# Patient Record
Sex: FEMALE | URBAN - NONMETROPOLITAN AREA
[De-identification: names, ages, dates, MRNs, and addresses within clinical notes are randomized per-mention and may not be internally consistent; named-entity substitution may affect disease eponyms.]

---

## 2022-02-11 ENCOUNTER — OFFICE VISIT (OUTPATIENT)
Dept: URBAN - NONMETROPOLITAN AREA CLINIC 4 | Facility: CLINIC | Age: 62
End: 2022-02-11

## 2022-03-01 ENCOUNTER — OFFICE VISIT (OUTPATIENT)
Dept: URBAN - METROPOLITAN AREA CLINIC 82 | Facility: CLINIC | Age: 62
End: 2022-03-01

## 2022-03-07 ENCOUNTER — OFFICE VISIT (OUTPATIENT)
Dept: URBAN - METROPOLITAN AREA CLINIC 82 | Facility: CLINIC | Age: 62
End: 2022-03-07

## 2023-03-10 ENCOUNTER — LAB OUTSIDE AN ENCOUNTER (OUTPATIENT)
Dept: URBAN - METROPOLITAN AREA CLINIC 82 | Facility: CLINIC | Age: 63
End: 2023-03-10

## 2023-03-10 ENCOUNTER — OFFICE VISIT (OUTPATIENT)
Dept: URBAN - METROPOLITAN AREA CLINIC 82 | Facility: CLINIC | Age: 63
End: 2023-03-10
Payer: COMMERCIAL

## 2023-03-10 VITALS
HEART RATE: 64 BPM | DIASTOLIC BLOOD PRESSURE: 78 MMHG | SYSTOLIC BLOOD PRESSURE: 129 MMHG | TEMPERATURE: 97.8 F | WEIGHT: 214 LBS | HEIGHT: 66 IN | BODY MASS INDEX: 34.39 KG/M2

## 2023-03-10 DIAGNOSIS — R10.13 EPIGASTRIC ABDOMINAL PAIN: ICD-10-CM

## 2023-03-10 DIAGNOSIS — K21.9 GASTROESOPHAGEAL REFLUX DISEASE, UNSPECIFIED WHETHER ESOPHAGITIS PRESENT: ICD-10-CM

## 2023-03-10 PROCEDURE — G9903 PT SCRN TBCO ID AS NON USER: HCPCS | Performed by: INTERNAL MEDICINE

## 2023-03-10 PROCEDURE — G8427 DOCREV CUR MEDS BY ELIG CLIN: HCPCS | Performed by: INTERNAL MEDICINE

## 2023-03-10 PROCEDURE — G8417 CALC BMI ABV UP PARAM F/U: HCPCS | Performed by: INTERNAL MEDICINE

## 2023-03-10 PROCEDURE — 99204 OFFICE O/P NEW MOD 45 MIN: CPT | Performed by: INTERNAL MEDICINE

## 2023-03-10 RX ORDER — LISINOPRIL 20 MG/1
1 TABLET TABLET ORAL ONCE A DAY
Status: ACTIVE | COMMUNITY

## 2023-03-10 RX ORDER — DEXLANSOPRAZOLE 60 MG/1
1 CAPSULE CAPSULE, DELAYED RELEASE ORAL ONCE A DAY
Qty: 90 | Refills: 1 | OUTPATIENT
Start: 2023-03-10

## 2023-03-10 RX ORDER — SUCRALFATE 1 G
1 TABLET ON AN EMPTY STOMACH TABLET ORAL TWICE A DAY
Qty: 180 TABLET | Refills: 1 | OUTPATIENT
Start: 2023-03-10 | End: 2023-09-06

## 2023-03-10 NOTE — HPI-TODAY'S VISIT:
3/10/2023 Patient is a 62 year old  female who presents for acid reflux. Patient states that she has a lot of burning and acid reflux. She states that the burning will migrate to her right upper quadrant and that it radiates to her back. She states that she has no issues with swallowing. Patient states that at night she has been having a lot of acid reflux. She states that she has had an EGD in the past. She states that she has been on pantoprazole for a while and that it really has not been helping. Patient states that she has tried carafate in the past and that it has helped some, and she has also tried omeprazole but she states that it did not work. She denies trying dexilant in the past. Patient was told that she does not have gastroparesis after doing the egg swallow test. She denies having a habit of taking NSAIDs. She denies family history of colon, esophageal, or stomach cancer. Patient states that her last GI doctor told her that he was worried about her symptoms being from her gall bladder, and that they checked for stones, but that they found none.

## 2023-03-13 ENCOUNTER — TELEPHONE ENCOUNTER (OUTPATIENT)
Dept: URBAN - METROPOLITAN AREA CLINIC 82 | Facility: CLINIC | Age: 63
End: 2023-03-13

## 2023-03-13 ENCOUNTER — TELEPHONE ENCOUNTER (OUTPATIENT)
Dept: URBAN - METROPOLITAN AREA CLINIC 35 | Facility: CLINIC | Age: 63
End: 2023-03-13

## 2023-03-13 RX ORDER — PANTOPRAZOLE SODIUM 40 MG/1
1 TABLET TABLET, DELAYED RELEASE ORAL ONCE A DAY
Qty: 90 | Refills: 1 | OUTPATIENT
Start: 2023-03-13

## 2023-04-20 ENCOUNTER — TELEPHONE ENCOUNTER (OUTPATIENT)
Dept: URBAN - METROPOLITAN AREA CLINIC 82 | Facility: CLINIC | Age: 63
End: 2023-04-20

## 2023-04-25 ENCOUNTER — OFFICE VISIT (OUTPATIENT)
Dept: URBAN - METROPOLITAN AREA MEDICAL CENTER 24 | Facility: MEDICAL CENTER | Age: 63
End: 2023-04-25
Payer: COMMERCIAL

## 2023-04-25 DIAGNOSIS — K29.60 ADENOPAPILLOMATOSIS GASTRICA: ICD-10-CM

## 2023-04-25 DIAGNOSIS — R07.89 ACUTE CHEST WALL PAIN: ICD-10-CM

## 2023-04-25 DIAGNOSIS — K21.00 ALKALINE REFLUX ESOPHAGITIS: ICD-10-CM

## 2023-04-25 PROCEDURE — 43239 EGD BIOPSY SINGLE/MULTIPLE: CPT | Performed by: INTERNAL MEDICINE

## 2023-04-25 PROCEDURE — 91035 G-ESOPH REFLX TST W/ELECTROD: CPT | Performed by: INTERNAL MEDICINE

## 2023-06-16 ENCOUNTER — LAB OUTSIDE AN ENCOUNTER (OUTPATIENT)
Dept: URBAN - METROPOLITAN AREA CLINIC 82 | Facility: CLINIC | Age: 63
End: 2023-06-16

## 2023-06-16 ENCOUNTER — OFFICE VISIT (OUTPATIENT)
Dept: URBAN - METROPOLITAN AREA CLINIC 82 | Facility: CLINIC | Age: 63
End: 2023-06-16
Payer: COMMERCIAL

## 2023-06-16 VITALS
HEIGHT: 66 IN | DIASTOLIC BLOOD PRESSURE: 76 MMHG | BODY MASS INDEX: 34.55 KG/M2 | WEIGHT: 215 LBS | SYSTOLIC BLOOD PRESSURE: 144 MMHG | TEMPERATURE: 98.2 F | HEART RATE: 69 BPM

## 2023-06-16 DIAGNOSIS — K21.9 GASTROESOPHAGEAL REFLUX DISEASE, UNSPECIFIED WHETHER ESOPHAGITIS PRESENT: ICD-10-CM

## 2023-06-16 DIAGNOSIS — R07.9 CHEST PAIN, UNSPECIFIED TYPE: ICD-10-CM

## 2023-06-16 DIAGNOSIS — R10.13 EPIGASTRIC ABDOMINAL PAIN: ICD-10-CM

## 2023-06-16 PROCEDURE — G8427 DOCREV CUR MEDS BY ELIG CLIN: HCPCS | Performed by: INTERNAL MEDICINE

## 2023-06-16 PROCEDURE — G8417 CALC BMI ABV UP PARAM F/U: HCPCS | Performed by: INTERNAL MEDICINE

## 2023-06-16 PROCEDURE — G9903 PT SCRN TBCO ID AS NON USER: HCPCS | Performed by: INTERNAL MEDICINE

## 2023-06-16 PROCEDURE — 99214 OFFICE O/P EST MOD 30 MIN: CPT | Performed by: INTERNAL MEDICINE

## 2023-06-16 RX ORDER — DICYCLOMINE HYDROCHLORIDE 10 MG/1
1 TABLET CAPSULE ORAL THREE TIMES A DAY
Qty: 90 | Refills: 1 | OUTPATIENT
Start: 2023-06-16 | End: 2023-08-15

## 2023-06-16 RX ORDER — DEXLANSOPRAZOLE 60 MG/1
1 CAPSULE CAPSULE, DELAYED RELEASE ORAL ONCE A DAY
Qty: 90 | Refills: 1 | Status: ACTIVE | COMMUNITY
Start: 2023-03-10

## 2023-06-16 RX ORDER — LISINOPRIL 20 MG/1
1 TABLET TABLET ORAL ONCE A DAY
Status: ACTIVE | COMMUNITY

## 2023-06-16 RX ORDER — PANTOPRAZOLE SODIUM 40 MG/1
1 TABLET TABLET, DELAYED RELEASE ORAL ONCE A DAY
Qty: 90 | Refills: 1 | Status: ACTIVE | COMMUNITY
Start: 2023-03-13

## 2023-06-16 RX ORDER — PANTOPRAZOLE SODIUM 40 MG/1
1 TABLET TABLET, DELAYED RELEASE ORAL ONCE A DAY
Qty: 90 | Refills: 1 | OUTPATIENT
Start: 2023-06-16

## 2023-06-16 RX ORDER — AMITRIPTYLINE HYDROCHLORIDE 25 MG/1
1 TABLET AT BEDTIME TABLET, FILM COATED ORAL ONCE A DAY
Qty: 90 | Refills: 1 | OUTPATIENT
Start: 2023-06-16

## 2023-06-16 RX ORDER — SUCRALFATE 1 G
1 TABLET ON AN EMPTY STOMACH TABLET ORAL TWICE A DAY
Qty: 180 TABLET | Refills: 1 | Status: ACTIVE | COMMUNITY
Start: 2023-03-10 | End: 2023-09-06

## 2023-06-16 NOTE — HPI-TODAY'S VISIT:
3/10/2023 Patient is a 62 year old  female who presents for acid reflux. Patient states that she has a lot of burning and acid reflux. She states that the burning will migrate to her right upper quadrant and that it radiates to her back. She states that she has no issues with swallowing. Patient states that at night she has been having a lot of acid reflux. She states that she has had an EGD in the past. She states that she has been on pantoprazole for a while and that it really has not been helping. Patient states that she has tried carafate in the past and that it has helped some, and she has also tried omeprazole but she states that it did not work. She denies trying dexilant in the past. Patient was told that she does not have gastroparesis after doing the egg swallow test. She denies having a habit of taking NSAIDs. She denies family history of colon, esophageal, or stomach cancer. Patient states that her last GI doctor told her that he was worried about her symptoms being from her gall bladder, and that they checked for stones, but that they found none.  6/16/2023 Patient presents for a follow up. Patient states that the heartburn and acid reflux is very bad. She states that she has been taking advil for the pain in her chest and epigastric area, and that it radiates to her right upper quadrant abdominal area. Patient denies going through stress, depression, or anxiety. She states that two other doctors had told her that she has a hiatal hernia but that was not seen in the EGD. Patient states that she has chest pain even when she does does movements with her right arm.

## 2023-07-28 ENCOUNTER — OFFICE VISIT (OUTPATIENT)
Dept: URBAN - METROPOLITAN AREA CLINIC 82 | Facility: CLINIC | Age: 63
End: 2023-07-28
Payer: COMMERCIAL

## 2023-07-28 VITALS
WEIGHT: 218 LBS | TEMPERATURE: 97.1 F | HEIGHT: 66 IN | HEART RATE: 64 BPM | DIASTOLIC BLOOD PRESSURE: 75 MMHG | BODY MASS INDEX: 35.03 KG/M2 | SYSTOLIC BLOOD PRESSURE: 154 MMHG

## 2023-07-28 DIAGNOSIS — K21.9 GASTROESOPHAGEAL REFLUX DISEASE, UNSPECIFIED WHETHER ESOPHAGITIS PRESENT: ICD-10-CM

## 2023-07-28 DIAGNOSIS — K22.4 ESOPHAGEAL SPASM: ICD-10-CM

## 2023-07-28 DIAGNOSIS — R10.13 EPIGASTRIC ABDOMINAL PAIN: ICD-10-CM

## 2023-07-28 DIAGNOSIS — R07.9 CHEST PAIN, UNSPECIFIED TYPE: ICD-10-CM

## 2023-07-28 PROCEDURE — 99214 OFFICE O/P EST MOD 30 MIN: CPT | Performed by: INTERNAL MEDICINE

## 2023-07-28 RX ORDER — PANTOPRAZOLE SODIUM 40 MG/1
1 TABLET TABLET, DELAYED RELEASE ORAL ONCE A DAY
Qty: 90 | Refills: 1 | Status: ON HOLD | COMMUNITY
Start: 2023-06-16

## 2023-07-28 RX ORDER — DICYCLOMINE HYDROCHLORIDE 10 MG/1
1 TABLET CAPSULE ORAL THREE TIMES A DAY
Qty: 90 | Refills: 1 | Status: ON HOLD | COMMUNITY
Start: 2023-06-16 | End: 2023-08-15

## 2023-07-28 RX ORDER — SUCRALFATE 1 G
1 TABLET ON AN EMPTY STOMACH TABLET ORAL TWICE A DAY
Qty: 180 TABLET | Refills: 1 | Status: ON HOLD | COMMUNITY
Start: 2023-03-10 | End: 2023-09-06

## 2023-07-28 RX ORDER — DILTIAZEM HYDROCHLORIDE 60 MG/1
AS DIRECTED TABLET, COATED ORAL THREE TIMES A DAY
Qty: 270 | Refills: 1 | OUTPATIENT
Start: 2023-07-28

## 2023-07-28 RX ORDER — AMITRIPTYLINE HYDROCHLORIDE 25 MG/1
1 TABLET AT BEDTIME TABLET, FILM COATED ORAL ONCE A DAY
Qty: 90 | Refills: 1 | Status: ON HOLD | COMMUNITY
Start: 2023-06-16

## 2023-07-28 RX ORDER — PANTOPRAZOLE SODIUM 40 MG/1
1 TABLET TABLET, DELAYED RELEASE ORAL ONCE A DAY
Qty: 90 | Refills: 1 | OUTPATIENT
Start: 2023-07-28

## 2023-07-28 RX ORDER — LISINOPRIL 20 MG/1
1 TABLET TABLET ORAL ONCE A DAY
Status: ACTIVE | COMMUNITY

## 2023-07-28 RX ORDER — DEXLANSOPRAZOLE 60 MG/1
1 CAPSULE CAPSULE, DELAYED RELEASE ORAL ONCE A DAY
Qty: 90 | Refills: 1 | Status: ON HOLD | COMMUNITY
Start: 2023-03-10

## 2023-07-28 RX ORDER — PANTOPRAZOLE SODIUM 40 MG/1
1 TABLET TABLET, DELAYED RELEASE ORAL ONCE A DAY
Qty: 90 | Refills: 1 | Status: ON HOLD | COMMUNITY
Start: 2023-03-13

## 2023-07-28 NOTE — HPI-TODAY'S VISIT:
3/10/2023 Patient is a 62 year old  female who presents for acid reflux. Patient states that she has a lot of burning and acid reflux. She states that the burning will migrate to her right upper quadrant and that it radiates to her back. She states that she has no issues with swallowing. Patient states that at night she has been having a lot of acid reflux. She states that she has had an EGD in the past. She states that she has been on pantoprazole for a while and that it really has not been helping. Patient states that she has tried carafate in the past and that it has helped some, and she has also tried omeprazole but she states that it did not work. She denies trying dexilant in the past. Patient was told that she does not have gastroparesis after doing the egg swallow test. She denies having a habit of taking NSAIDs. She denies family history of colon, esophageal, or stomach cancer. Patient states that her last GI doctor told her that he was worried about her symptoms being from her gall bladder, and that they checked for stones, but that they found none.  6/16/2023 Patient presents for a follow up. Patient states that the heartburn and acid reflux is very bad. She states that she has been taking advil for the pain in her chest and epigastric area, and that it radiates to her right upper quadrant abdominal area. Patient denies going through stress, depression, or anxiety. She states that two other doctors had told her that she has a hiatal hernia but that was not seen in the EGD. Patient states that she has chest pain even when she does does movements with her right arm.  7/28/2023 Patient is here for a follow up. Patient continues to experience chest pain. Patient experiences the most chest pain when she eats. She is currently taking lisinopril for her high blood pressure. She is not complaining of any abdominal pain.

## 2023-09-21 ENCOUNTER — OFFICE VISIT (OUTPATIENT)
Dept: URBAN - METROPOLITAN AREA CLINIC 115 | Facility: CLINIC | Age: 63
End: 2023-09-21

## 2023-10-26 ENCOUNTER — OFFICE VISIT (OUTPATIENT)
Dept: URBAN - METROPOLITAN AREA CLINIC 115 | Facility: CLINIC | Age: 63
End: 2023-10-26
Payer: COMMERCIAL

## 2023-10-26 VITALS
HEIGHT: 66 IN | HEART RATE: 59 BPM | SYSTOLIC BLOOD PRESSURE: 184 MMHG | TEMPERATURE: 97.3 F | DIASTOLIC BLOOD PRESSURE: 107 MMHG | RESPIRATION RATE: 15 BRPM | WEIGHT: 213.4 LBS | BODY MASS INDEX: 34.3 KG/M2

## 2023-10-26 DIAGNOSIS — R07.9 CHEST PAIN, UNSPECIFIED TYPE: ICD-10-CM

## 2023-10-26 DIAGNOSIS — K21.9 GASTROESOPHAGEAL REFLUX DISEASE, UNSPECIFIED WHETHER ESOPHAGITIS PRESENT: ICD-10-CM

## 2023-10-26 DIAGNOSIS — R10.13 EPIGASTRIC ABDOMINAL PAIN: ICD-10-CM

## 2023-10-26 DIAGNOSIS — K22.4 ESOPHAGEAL SPASM: ICD-10-CM

## 2023-10-26 PROBLEM — 266434009: Status: ACTIVE | Noted: 2023-07-28

## 2023-10-26 PROBLEM — 235595009: Status: ACTIVE | Noted: 2023-03-10

## 2023-10-26 PROCEDURE — 99214 OFFICE O/P EST MOD 30 MIN: CPT | Performed by: INTERNAL MEDICINE

## 2023-10-26 RX ORDER — PANTOPRAZOLE SODIUM 40 MG/1
1 TABLET TABLET, DELAYED RELEASE ORAL ONCE A DAY
Qty: 90 | Refills: 1 | Status: ACTIVE | COMMUNITY
Start: 2023-07-28

## 2023-10-26 RX ORDER — DEXLANSOPRAZOLE 60 MG/1
1 CAPSULE CAPSULE, DELAYED RELEASE ORAL ONCE A DAY
Qty: 90 | Refills: 1 | Status: ON HOLD | COMMUNITY
Start: 2023-03-10

## 2023-10-26 RX ORDER — LISINOPRIL 20 MG/1
1 TABLET TABLET ORAL ONCE A DAY
Status: ACTIVE | COMMUNITY

## 2023-10-26 RX ORDER — DILTIAZEM HYDROCHLORIDE 60 MG/1
AS DIRECTED TABLET, COATED ORAL THREE TIMES A DAY
Qty: 270 | Refills: 1 | Status: ACTIVE | COMMUNITY
Start: 2023-07-28

## 2023-10-26 RX ORDER — PANTOPRAZOLE SODIUM 40 MG/1
1 TABLET TABLET, DELAYED RELEASE ORAL ONCE A DAY
Qty: 90 | Refills: 1 | Status: ON HOLD | COMMUNITY
Start: 2023-03-13

## 2023-10-26 RX ORDER — PANTOPRAZOLE SODIUM 40 MG/1
1 TABLET TABLET, DELAYED RELEASE ORAL ONCE A DAY
Qty: 90 | Refills: 1 | Status: ON HOLD | COMMUNITY
Start: 2023-06-16

## 2023-10-26 RX ORDER — AMITRIPTYLINE HYDROCHLORIDE 25 MG/1
1 TABLET AT BEDTIME TABLET, FILM COATED ORAL ONCE A DAY
Qty: 90 | Refills: 1 | Status: ON HOLD | COMMUNITY
Start: 2023-06-16

## 2023-10-26 RX ORDER — LANSOPRAZOLE 30 MG/1
1 CAPSULE BEFORE A MEAL CAPSULE, DELAYED RELEASE ORAL TWICE A DAY
Qty: 60 CAPSULE | Refills: 3 | OUTPATIENT
Start: 2023-10-26

## 2023-10-26 NOTE — HPI-TODAY'S VISIT:
3/10/2023 Patient is a 62 year old  female who presents for acid reflux. Patient states that she has a lot of burning and acid reflux. She states that the burning will migrate to her right upper quadrant and that it radiates to her back. She states that she has no issues with swallowing. Patient states that at night she has been having a lot of acid reflux. She states that she has had an EGD in the past. She states that she has been on pantoprazole for a while and that it really has not been helping. Patient states that she has tried carafate in the past and that it has helped some, and she has also tried omeprazole but she states that it did not work. She denies trying dexilant in the past. Patient was told that she does not have gastroparesis after doing the egg swallow test. She denies having a habit of taking NSAIDs. She denies family history of colon, esophageal, or stomach cancer. Patient states that her last GI doctor told her that he was worried about her symptoms being from her gall bladder, and that they checked for stones, but that they found none.  6/16/2023 Patient presents for a follow up. Patient states that the heartburn and acid reflux is very bad. She states that she has been taking advil for the pain in her chest and epigastric area, and that it radiates to her right upper quadrant abdominal area. Patient denies going through stress, depression, or anxiety. She states that two other doctors had told her that she has a hiatal hernia but that was not seen in the EGD. Patient states that she has chest pain even when she does does movements with her right arm.  7/28/2023 Patient is here for a follow up. Patient continues to experience chest pain. Patient experiences the most chest pain when she eats. She is currently taking lisinopril for her high blood pressure. She is not complaining of any abdominal pain.  10/26/2023 Patient presetns for a follow up. She denies trouble swallowing and states that she has epigastric abdominal pain. She denies taking natural medications or NSAIDs. Patient is on pantoprazole and states that it has not been helping much. She denies any depression or stress. Patient states that she has pain almost every day. Patient states that she does not sleep enough hours at night. She denies any improvement in pain with dicyclomine.

## 2023-10-27 LAB
A/G RATIO: 1.7
ALBUMIN: 4.1
ALKALINE PHOSPHATASE: 55
ALT (SGPT): 18
AST (SGOT): 18
BASO (ABSOLUTE): 0
BASOS: 1
BILIRUBIN, TOTAL: 0.3
BUN/CREATININE RATIO: 16
BUN: 10
CALCIUM: 9.3
CARBON DIOXIDE, TOTAL: 25
CHLORIDE: 104
CREATININE: 0.61
EGFR: 100
EOS (ABSOLUTE): 0.2
EOS: 2
GLOBULIN, TOTAL: 2.4
GLUCOSE: 86
HEMATOCRIT: 33.5
HEMOGLOBIN: 10.4
IMMATURE GRANS (ABS): 0
IMMATURE GRANULOCYTES: 0
LIPASE: 40
LYMPHS (ABSOLUTE): 2.4
LYMPHS: 34
MCH: 28.3
MCHC: 31
MCV: 91
MONOCYTES(ABSOLUTE): 0.7
MONOCYTES: 10
NEUTROPHILS (ABSOLUTE): 3.8
NEUTROPHILS: 53
PLATELETS: 209
POTASSIUM: 4.2
PROTEIN, TOTAL: 6.5
RBC: 3.68
RDW: 11.7
SODIUM: 140
WBC: 7.1

## 2023-11-03 ENCOUNTER — TELEPHONE ENCOUNTER (OUTPATIENT)
Dept: URBAN - METROPOLITAN AREA CLINIC 6 | Facility: CLINIC | Age: 63
End: 2023-11-03

## 2023-11-03 RX ORDER — LANSOPRAZOLE 30 MG/1
1 CAPSULE BEFORE A MEAL CAPSULE, DELAYED RELEASE ORAL ONCE A DAY
Qty: 90 CAPSULE | Refills: 1 | OUTPATIENT
Start: 2023-11-03

## 2024-02-29 ENCOUNTER — LAB (OUTPATIENT)
Dept: URBAN - METROPOLITAN AREA CLINIC 115 | Facility: CLINIC | Age: 64
End: 2024-02-29

## 2024-02-29 ENCOUNTER — OV EP (OUTPATIENT)
Dept: URBAN - METROPOLITAN AREA CLINIC 115 | Facility: CLINIC | Age: 64
End: 2024-02-29
Payer: COMMERCIAL

## 2024-02-29 VITALS
WEIGHT: 213.4 LBS | SYSTOLIC BLOOD PRESSURE: 132 MMHG | TEMPERATURE: 98 F | BODY MASS INDEX: 34.3 KG/M2 | HEIGHT: 66 IN | HEART RATE: 52 BPM | DIASTOLIC BLOOD PRESSURE: 82 MMHG

## 2024-02-29 DIAGNOSIS — K22.4 ESOPHAGEAL SPASM: ICD-10-CM

## 2024-02-29 DIAGNOSIS — R10.13 EPIGASTRIC ABDOMINAL PAIN: ICD-10-CM

## 2024-02-29 DIAGNOSIS — R07.9 CHEST PAIN, UNSPECIFIED TYPE: ICD-10-CM

## 2024-02-29 DIAGNOSIS — D50.9 IRON DEFICIENCY ANEMIA, UNSPECIFIED IRON DEFICIENCY ANEMIA TYPE: ICD-10-CM

## 2024-02-29 DIAGNOSIS — K21.9 GASTROESOPHAGEAL REFLUX DISEASE, UNSPECIFIED WHETHER ESOPHAGITIS PRESENT: ICD-10-CM

## 2024-02-29 PROBLEM — 87522002: Status: ACTIVE | Noted: 2024-02-29

## 2024-02-29 PROCEDURE — 99214 OFFICE O/P EST MOD 30 MIN: CPT | Performed by: INTERNAL MEDICINE

## 2024-02-29 RX ORDER — AMITRIPTYLINE HYDROCHLORIDE 25 MG/1
1 TABLET AT BEDTIME TABLET, FILM COATED ORAL ONCE A DAY
Qty: 90 | Refills: 1 | Status: ON HOLD | COMMUNITY
Start: 2023-06-16

## 2024-02-29 RX ORDER — LANSOPRAZOLE 30 MG/1
1 CAPSULE BEFORE A MEAL CAPSULE, DELAYED RELEASE ORAL ONCE A DAY
Qty: 90 CAPSULE | Refills: 1 | Status: ACTIVE | COMMUNITY
Start: 2023-11-03

## 2024-02-29 RX ORDER — LANSOPRAZOLE 30 MG/1
1 CAPSULE BEFORE A MEAL CAPSULE, DELAYED RELEASE ORAL TWICE A DAY
Qty: 60 CAPSULE | Refills: 3 | Status: ACTIVE | COMMUNITY
Start: 2023-10-26

## 2024-02-29 RX ORDER — PANTOPRAZOLE SODIUM 40 MG/1
1 TABLET TABLET, DELAYED RELEASE ORAL ONCE A DAY
Qty: 90 | Refills: 1 | Status: ON HOLD | COMMUNITY
Start: 2023-06-16

## 2024-02-29 RX ORDER — PANTOPRAZOLE SODIUM 40 MG/1
1 TABLET TABLET, DELAYED RELEASE ORAL ONCE A DAY
Qty: 90 | Refills: 1 | Status: ON HOLD | COMMUNITY
Start: 2023-03-13

## 2024-02-29 RX ORDER — DILTIAZEM HYDROCHLORIDE 60 MG/1
AS DIRECTED TABLET, COATED ORAL THREE TIMES A DAY
Qty: 270 | Refills: 1 | Status: ACTIVE | COMMUNITY
Start: 2023-07-28

## 2024-02-29 RX ORDER — DEXLANSOPRAZOLE 60 MG/1
1 CAPSULE CAPSULE, DELAYED RELEASE ORAL ONCE A DAY
Qty: 90 | Refills: 1 | Status: ON HOLD | COMMUNITY
Start: 2023-03-10

## 2024-02-29 RX ORDER — LISINOPRIL 20 MG/1
1 TABLET TABLET ORAL ONCE A DAY
Status: ACTIVE | COMMUNITY

## 2024-02-29 RX ORDER — PANTOPRAZOLE SODIUM 40 MG/1
1 TABLET TABLET, DELAYED RELEASE ORAL ONCE A DAY
Qty: 90 | Refills: 1 | Status: ACTIVE | COMMUNITY
Start: 2023-07-28

## 2024-02-29 NOTE — HPI-TODAY'S VISIT:
3/10/2023 Patient is a 62 year old  female who presents for acid reflux. Patient states that she has a lot of burning and acid reflux. She states that the burning will migrate to her right upper quadrant and that it radiates to her back. She states that she has no issues with swallowing. Patient states that at night she has been having a lot of acid reflux. She states that she has had an EGD in the past. She states that she has been on pantoprazole for a while and that it really has not been helping. Patient states that she has tried carafate in the past and that it has helped some, and she has also tried omeprazole but she states that it did not work. She denies trying dexilant in the past. Patient was told that she does not have gastroparesis after doing the egg swallow test. She denies having a habit of taking NSAIDs. She denies family history of colon, esophageal, or stomach cancer. Patient states that her last GI doctor told her that he was worried about her symptoms being from her gall bladder, and that they checked for stones, but that they found none.  6/16/2023 Patient presents for a follow up. Patient states that the heartburn and acid reflux is very bad. She states that she has been taking advil for the pain in her chest and epigastric area, and that it radiates to her right upper quadrant abdominal area. Patient denies going through stress, depression, or anxiety. She states that two other doctors had told her that she has a hiatal hernia but that was not seen in the EGD. Patient states that she has chest pain even when she does does movements with her right arm.  7/28/2023 Patient is here for a follow up. Patient continues to experience chest pain. Patient experiences the most chest pain when she eats. She is currently taking lisinopril for her high blood pressure. She is not complaining of any abdominal pain.  10/26/2023 Patient presetns for a follow up. She denies trouble swallowing and states that she has epigastric abdominal pain. She denies taking natural medications or NSAIDs. Patient is on pantoprazole and states that it has not been helping much. She denies any depression or stress. Patient states that she has pain almost every day. Patient states that she does not sleep enough hours at night. She denies any improvement in pain with dicyclomine.  2/29/2024 Patient presents for a follow up. Patient states that she went to see Dr. Lomeli and she went through a stress test and calcium build up. She is still having the same chest pain. Patient's last colonoscopy was 2 years ago as per patient which was with Dr. Aguilar, and states that everything was normal. She states that when she takes pantoprazole, that it does not help the pain.

## 2024-04-11 ENCOUNTER — OV EP (OUTPATIENT)
Dept: URBAN - METROPOLITAN AREA CLINIC 115 | Facility: CLINIC | Age: 64
End: 2024-04-11
Payer: COMMERCIAL

## 2024-04-11 VITALS
TEMPERATURE: 94.9 F | HEIGHT: 66 IN | HEART RATE: 71 BPM | DIASTOLIC BLOOD PRESSURE: 80 MMHG | BODY MASS INDEX: 33.97 KG/M2 | SYSTOLIC BLOOD PRESSURE: 144 MMHG | WEIGHT: 211.4 LBS

## 2024-04-11 DIAGNOSIS — K22.4 ESOPHAGEAL SPASM: ICD-10-CM

## 2024-04-11 DIAGNOSIS — D50.9 IRON DEFICIENCY ANEMIA, UNSPECIFIED IRON DEFICIENCY ANEMIA TYPE: ICD-10-CM

## 2024-04-11 DIAGNOSIS — R10.13 EPIGASTRIC ABDOMINAL PAIN: ICD-10-CM

## 2024-04-11 DIAGNOSIS — K21.9 GASTROESOPHAGEAL REFLUX DISEASE, UNSPECIFIED WHETHER ESOPHAGITIS PRESENT: ICD-10-CM

## 2024-04-11 DIAGNOSIS — R07.9 CHEST PAIN, UNSPECIFIED TYPE: ICD-10-CM

## 2024-04-11 DIAGNOSIS — K44.9 HIATAL HERNIA: ICD-10-CM

## 2024-04-11 PROCEDURE — 99214 OFFICE O/P EST MOD 30 MIN: CPT | Performed by: INTERNAL MEDICINE

## 2024-04-11 RX ORDER — PANTOPRAZOLE SODIUM 40 MG/1
1 TABLET TABLET, DELAYED RELEASE ORAL ONCE A DAY
Qty: 90 | Refills: 1 | Status: ACTIVE | COMMUNITY
Start: 2023-07-28

## 2024-04-11 RX ORDER — PANTOPRAZOLE SODIUM 40 MG/1
1 TABLET TABLET, DELAYED RELEASE ORAL TWICE A DAY
Qty: 180 TABLET | Refills: 1 | OUTPATIENT
Start: 2024-04-11

## 2024-04-11 RX ORDER — DEXLANSOPRAZOLE 60 MG/1
1 CAPSULE CAPSULE, DELAYED RELEASE ORAL ONCE A DAY
Qty: 90 | Refills: 1 | Status: ON HOLD | COMMUNITY
Start: 2023-03-10

## 2024-04-11 RX ORDER — LISINOPRIL 20 MG/1
1 TABLET TABLET ORAL ONCE A DAY
Status: ACTIVE | COMMUNITY

## 2024-04-11 RX ORDER — PANTOPRAZOLE SODIUM 40 MG/1
1 TABLET TABLET, DELAYED RELEASE ORAL ONCE A DAY
Qty: 90 | Refills: 1 | Status: ON HOLD | COMMUNITY
Start: 2023-03-13

## 2024-04-11 RX ORDER — LANSOPRAZOLE 30 MG/1
1 CAPSULE BEFORE A MEAL CAPSULE, DELAYED RELEASE ORAL ONCE A DAY
Qty: 90 CAPSULE | Refills: 1 | Status: ACTIVE | COMMUNITY
Start: 2023-11-03

## 2024-04-11 RX ORDER — DILTIAZEM HYDROCHLORIDE 60 MG/1
AS DIRECTED TABLET, COATED ORAL THREE TIMES A DAY
Qty: 270 | Refills: 1 | Status: ACTIVE | COMMUNITY
Start: 2023-07-28

## 2024-04-11 RX ORDER — IRON,FM,PS/FOLIC/B,C18/L.CASEI 130-1.25MG
1 CAPSULE BETWEEN MEALS CAPSULE ORAL ONCE A DAY
Qty: 90 | Refills: 1 | OUTPATIENT
Start: 2024-04-11

## 2024-04-11 RX ORDER — PANTOPRAZOLE SODIUM 40 MG/1
1 TABLET TABLET, DELAYED RELEASE ORAL ONCE A DAY
Qty: 90 | Refills: 1 | Status: ON HOLD | COMMUNITY
Start: 2023-06-16

## 2024-04-11 RX ORDER — LANSOPRAZOLE 30 MG/1
1 CAPSULE BEFORE A MEAL CAPSULE, DELAYED RELEASE ORAL TWICE A DAY
Qty: 60 CAPSULE | Refills: 3 | Status: ACTIVE | COMMUNITY
Start: 2023-10-26

## 2024-04-11 RX ORDER — AMITRIPTYLINE HYDROCHLORIDE 25 MG/1
1 TABLET AT BEDTIME TABLET, FILM COATED ORAL ONCE A DAY
Qty: 90 | Refills: 1 | Status: ON HOLD | COMMUNITY
Start: 2023-06-16

## 2024-04-11 NOTE — PHYSICAL EXAM GASTROINTESTINAL
Abdomen , soft, nontender, nondistended , no guarding or rigidity , no masses palpable , normal bowel sounds , Liver and Spleen , no hepatomegaly present , no hepatosplenomegaly , liver nontender , spleen not palpable [facility]

## 2024-04-11 NOTE — HPI-TODAY'S VISIT:
3/10/2023 Patient is a 62 year old  female who presents for acid reflux. Patient states that she has a lot of burning and acid reflux. She states that the burning will migrate to her right upper quadrant and that it radiates to her back. She states that she has no issues with swallowing. Patient states that at night she has been having a lot of acid reflux. She states that she has had an EGD in the past. She states that she has been on pantoprazole for a while and that it really has not been helping. Patient states that she has tried carafate in the past and that it has helped some, and she has also tried omeprazole but she states that it did not work. She denies trying dexilant in the past. Patient was told that she does not have gastroparesis after doing the egg swallow test. She denies having a habit of taking NSAIDs. She denies family history of colon, esophageal, or stomach cancer. Patient states that her last GI doctor told her that he was worried about her symptoms being from her gall bladder, and that they checked for stones, but that they found none.  6/16/2023 Patient presents for a follow up. Patient states that the heartburn and acid reflux is very bad. She states that she has been taking advil for the pain in her chest and epigastric area, and that it radiates to her right upper quadrant abdominal area. Patient denies going through stress, depression, or anxiety. She states that two other doctors had told her that she has a hiatal hernia but that was not seen in the EGD. Patient states that she has chest pain even when she does does movements with her right arm.  7/28/2023 Patient is here for a follow up. Patient continues to experience chest pain. Patient experiences the most chest pain when she eats. She is currently taking lisinopril for her high blood pressure. She is not complaining of any abdominal pain.  10/26/2023 Patient presetns for a follow up. She denies trouble swallowing and states that she has epigastric abdominal pain. She denies taking natural medications or NSAIDs. Patient is on pantoprazole and states that it has not been helping much. She denies any depression or stress. Patient states that she has pain almost every day. Patient states that she does not sleep enough hours at night. She denies any improvement in pain with dicyclomine.  2/29/2024 Patient presents for a follow up. Patient states that she went to see Dr. Lomeli and she went through a stress test and calcium build up. She is still having the same chest pain. Patient's last colonoscopy was 2 years ago as per patient which was with Dr. Aguilar, and states that everything was normal. She states that when she takes pantoprazole, that it does not help the pain.  4/11/2024 Patient presents for a follow up. She states that Dr. Austin told her gall baldder is functioning well. She is on pantoprazole 40 mg bid. Patient is currently seeing Dr. Cook and was told that she has a hiatal hernia, which is being evaluated by him. She denies noticing hematochezia or melena. She is on sucralfate.

## 2024-04-30 ENCOUNTER — LAB (OUTPATIENT)
Dept: URBAN - METROPOLITAN AREA MEDICAL CENTER 24 | Facility: MEDICAL CENTER | Age: 64
End: 2024-04-30

## 2024-04-30 PROCEDURE — 91010 ESOPHAGUS MOTILITY STUDY: CPT | Performed by: INTERNAL MEDICINE

## 2024-07-18 ENCOUNTER — OFFICE VISIT (OUTPATIENT)
Dept: URBAN - METROPOLITAN AREA CLINIC 115 | Facility: CLINIC | Age: 64
End: 2024-07-18

## 2024-07-22 ENCOUNTER — TELEPHONE ENCOUNTER (OUTPATIENT)
Dept: URBAN - METROPOLITAN AREA CLINIC 115 | Facility: CLINIC | Age: 64
End: 2024-07-22

## 2024-07-22 RX ORDER — AMITRIPTYLINE HYDROCHLORIDE 25 MG/1
1 TABLET AT BEDTIME TABLET, FILM COATED ORAL ONCE A DAY
Qty: 90 | Refills: 1
Start: 2023-06-16

## 2024-08-07 ENCOUNTER — OFFICE VISIT (OUTPATIENT)
Dept: URBAN - METROPOLITAN AREA CLINIC 115 | Facility: CLINIC | Age: 64
End: 2024-08-07
Payer: COMMERCIAL

## 2024-08-07 ENCOUNTER — DASHBOARD ENCOUNTERS (OUTPATIENT)
Age: 64
End: 2024-08-07

## 2024-08-07 VITALS
HEIGHT: 66 IN | BODY MASS INDEX: 34.39 KG/M2 | TEMPERATURE: 98.1 F | SYSTOLIC BLOOD PRESSURE: 123 MMHG | HEART RATE: 69 BPM | WEIGHT: 214 LBS | DIASTOLIC BLOOD PRESSURE: 76 MMHG

## 2024-08-07 DIAGNOSIS — R10.13 ABDOMINAL DISCOMFORT, EPIGASTRIC: ICD-10-CM

## 2024-08-07 PROBLEM — 3696007: Status: ACTIVE | Noted: 2024-08-07

## 2024-08-07 PROCEDURE — 99214 OFFICE O/P EST MOD 30 MIN: CPT

## 2024-08-07 RX ORDER — PANTOPRAZOLE SODIUM 40 MG/1
1 TABLET TABLET, DELAYED RELEASE ORAL ONCE A DAY
Qty: 90 | Refills: 1 | Status: ON HOLD | COMMUNITY
Start: 2023-06-16

## 2024-08-07 RX ORDER — LISINOPRIL 20 MG/1
1 TABLET TABLET ORAL ONCE A DAY
Status: ACTIVE | COMMUNITY

## 2024-08-07 RX ORDER — DEXLANSOPRAZOLE 60 MG/1
1 CAPSULE CAPSULE, DELAYED RELEASE ORAL ONCE A DAY
Qty: 90 | Refills: 1 | Status: ON HOLD | COMMUNITY
Start: 2023-03-10

## 2024-08-07 RX ORDER — PANTOPRAZOLE SODIUM 40 MG/1
1 TABLET TABLET, DELAYED RELEASE ORAL TWICE A DAY
Qty: 180 TABLET | Refills: 1 | Status: ACTIVE | COMMUNITY
Start: 2024-04-11

## 2024-08-07 RX ORDER — PANTOPRAZOLE SODIUM 40 MG/1
1 TABLET TABLET, DELAYED RELEASE ORAL ONCE A DAY
Qty: 90 | Refills: 1 | Status: ON HOLD | COMMUNITY
Start: 2023-03-13

## 2024-08-07 RX ORDER — LANSOPRAZOLE 30 MG/1
1 CAPSULE BEFORE A MEAL CAPSULE, DELAYED RELEASE ORAL TWICE A DAY
Qty: 60 CAPSULE | Refills: 3 | Status: ACTIVE | COMMUNITY
Start: 2023-10-26

## 2024-08-07 RX ORDER — PANTOPRAZOLE SODIUM 40 MG/1
1 TABLET TABLET, DELAYED RELEASE ORAL ONCE A DAY
Qty: 90 | Refills: 1 | Status: ACTIVE | COMMUNITY
Start: 2023-07-28

## 2024-08-07 RX ORDER — AMITRIPTYLINE HYDROCHLORIDE 25 MG/1
1 TABLET AT BEDTIME TABLET, FILM COATED ORAL ONCE A DAY
Qty: 90 | Refills: 1 | Status: ACTIVE | COMMUNITY
Start: 2023-06-16

## 2024-08-07 RX ORDER — LANSOPRAZOLE 30 MG/1
1 CAPSULE BEFORE A MEAL CAPSULE, DELAYED RELEASE ORAL ONCE A DAY
Qty: 90 CAPSULE | Refills: 1 | Status: ACTIVE | COMMUNITY
Start: 2023-11-03

## 2024-08-07 RX ORDER — DILTIAZEM HYDROCHLORIDE 60 MG/1
AS DIRECTED TABLET, COATED ORAL THREE TIMES A DAY
Qty: 270 | Refills: 1 | Status: ACTIVE | COMMUNITY
Start: 2023-07-28

## 2024-08-07 RX ORDER — IRON,FM,PS/FOLIC/B,C18/L.CASEI 130-1.25MG
1 CAPSULE BETWEEN MEALS CAPSULE ORAL ONCE A DAY
Qty: 90 | Refills: 1 | Status: ACTIVE | COMMUNITY
Start: 2024-04-11

## 2024-08-07 NOTE — HPI-OTHER HISTORIES
Notes from Dr. Hudson: 3/10/2023 Patient is a 62 year old  female who presents for acid reflux. Patient states that she has a lot of burning and acid reflux. She states that the burning will migrate to her right upper quadrant and that it radiates to her back. She states that she has no issues with swallowing. Patient states that at night she has been having a lot of acid reflux. She states that she has had an EGD in the past. She states that she has been on pantoprazole for a while and that it really has not been helping. Patient states that she has tried carafate in the past and that it has helped some, and she has also tried omeprazole but she states that it did not work. She denies trying dexilant in the past. Patient was told that she does not have gastroparesis after doing the egg swallow test. She denies having a habit of taking NSAIDs. She denies family history of colon, esophageal, or stomach cancer. Patient states that her last GI doctor told her that he was worried about her symptoms being from her gall bladder, and that they checked for stones, but that they found none.  6/16/2023 Patient presents for a follow up. Patient states that the heartburn and acid reflux is very bad. She states that she has been taking advil for the pain in her chest and epigastric area, and that it radiates to her right upper quadrant abdominal area. Patient denies going through stress, depression, or anxiety. She states that two other doctors had told her that she has a hiatal hernia but that was not seen in the EGD. Patient states that she has chest pain even when she does does movements with her right arm.  7/28/2023 Patient is here for a follow up. Patient continues to experience chest pain. Patient experiences the most chest pain when she eats. She is currently taking lisinopril for her high blood pressure. She is not complaining of any abdominal pain.  10/26/2023 Patient presetns for a follow up. She denies trouble swallowing and states that she has epigastric abdominal pain. She denies taking natural medications or NSAIDs. Patient is on pantoprazole and states that it has not been helping much. She denies any depression or stress. Patient states that she has pain almost every day. Patient states that she does not sleep enough hours at night. She denies any improvement in pain with dicyclomine.  2/29/2024 Patient presents for a follow up. Patient states that she went to see Dr. Lomeli and she went through a stress test and calcium build up. She is still having the same chest pain. Patient's last colonoscopy was 2 years ago as per patient which was with Dr. Aguilar, and states that everything was normal. She states that when she takes pantoprazole, that it does not help the pain.  4/11/2024 Patient presents for a follow up. She states that Dr. Austin told her gall baldder is functioning well. She is on pantoprazole 40 mg bid. Patient is currently seeing Dr. Cook and was told that she has a hiatal hernia, which is being evaluated by him. She denies noticing hematochezia or melena. She is on sucralfate.

## 2024-08-07 NOTE — HPI-TODAY'S VISIT:
8/7/24 with Aditi Andres PA-C: Pt has been on pantoprazole 40mg BID and sucralfate PRN, Fusion plus for JET, seeing Dr. Cook for hiatal hernia/gallbladder. States she is not doing the sucralfate, amitryptiline 25mg. Per Dr. Cook's note, he recommends repeat EGD to r/o any other sig pathology in stomach or duodenum before considering cholecystectomy. Today she reports she cannot sleep laying flat, would wake up coughing, burning and sour/bitter taste in mouth. Has had neg cardiac workup (echo, stress test). Denies PMH of MI, stroke, seizures, BiPAP use, pacemaker/defibrillator, blood thinners, ESRD.  Summary of previous workup: 24hr pH monitoring reflux study negative 5/23/24. Esophageal manometry negative 4/30/24. EGD 4/2023 showed mild chronic gastritis with mild reactive change, LA grade A esophagitis. HIDA 3/2024 negtive. Barium swallow 7/2023 showed intermittent esophageal spasm, GE reflux, small HH. 2022 neg GES.

## 2024-09-09 ENCOUNTER — OFFICE VISIT (OUTPATIENT)
Dept: URBAN - METROPOLITAN AREA SURGERY CENTER 13 | Facility: SURGERY CENTER | Age: 64
End: 2024-09-09
Payer: COMMERCIAL

## 2024-09-09 DIAGNOSIS — K29.30 CHRONIC SUPERFICIAL GASTRITIS WITHOUT BLEEDING: ICD-10-CM

## 2024-09-09 DIAGNOSIS — R10.13 DYSPEPSIA: ICD-10-CM

## 2024-09-09 DIAGNOSIS — K21.00 GERD WITH ESOPHAGITIS WITHOUT BLEEDING: ICD-10-CM

## 2024-09-09 DIAGNOSIS — K31.89 OTHER DISEASES OF STOMACH AND DUODENUM: ICD-10-CM

## 2024-09-09 DIAGNOSIS — K29.60 OTHER GASTRITIS WITHOUT BLEEDING: ICD-10-CM

## 2024-09-09 PROCEDURE — 00731 ANES UPR GI NDSC PX NOS: CPT | Performed by: ANESTHESIOLOGY

## 2024-09-09 PROCEDURE — 43239 EGD BIOPSY SINGLE/MULTIPLE: CPT | Performed by: INTERNAL MEDICINE

## 2024-09-09 PROCEDURE — 00731 ANES UPR GI NDSC PX NOS: CPT | Performed by: ANESTHESIOLOGIST ASSISTANT

## 2024-09-09 RX ORDER — PANTOPRAZOLE SODIUM 40 MG/1
1 TABLET TABLET, DELAYED RELEASE ORAL TWICE A DAY
Qty: 180 TABLET | Refills: 1 | Status: ACTIVE | COMMUNITY
Start: 2024-04-11

## 2024-09-09 RX ORDER — PANTOPRAZOLE SODIUM 40 MG/1
1 TABLET TABLET, DELAYED RELEASE ORAL ONCE A DAY
Qty: 90 | Refills: 1 | Status: ON HOLD | COMMUNITY
Start: 2023-03-13

## 2024-09-09 RX ORDER — IRON,FM,PS/FOLIC/B,C18/L.CASEI 130-1.25MG
1 CAPSULE BETWEEN MEALS CAPSULE ORAL ONCE A DAY
Qty: 90 | Refills: 1 | Status: ACTIVE | COMMUNITY
Start: 2024-04-11

## 2024-09-09 RX ORDER — DILTIAZEM HYDROCHLORIDE 60 MG/1
AS DIRECTED TABLET, COATED ORAL THREE TIMES A DAY
Qty: 270 | Refills: 1 | Status: ACTIVE | COMMUNITY
Start: 2023-07-28

## 2024-09-09 RX ORDER — LISINOPRIL 20 MG/1
1 TABLET TABLET ORAL ONCE A DAY
Status: ACTIVE | COMMUNITY

## 2024-09-09 RX ORDER — LANSOPRAZOLE 30 MG/1
1 CAPSULE BEFORE A MEAL CAPSULE, DELAYED RELEASE ORAL TWICE A DAY
Qty: 60 CAPSULE | Refills: 3 | Status: ACTIVE | COMMUNITY
Start: 2023-10-26

## 2024-09-09 RX ORDER — PANTOPRAZOLE SODIUM 40 MG/1
1 TABLET TABLET, DELAYED RELEASE ORAL ONCE A DAY
Qty: 90 | Refills: 1 | Status: ACTIVE | COMMUNITY
Start: 2023-07-28

## 2024-09-09 RX ORDER — DEXLANSOPRAZOLE 60 MG/1
1 CAPSULE CAPSULE, DELAYED RELEASE ORAL ONCE A DAY
Qty: 90 | Refills: 1 | Status: ON HOLD | COMMUNITY
Start: 2023-03-10

## 2024-09-09 RX ORDER — LANSOPRAZOLE 30 MG/1
1 CAPSULE BEFORE A MEAL CAPSULE, DELAYED RELEASE ORAL ONCE A DAY
Qty: 90 CAPSULE | Refills: 1 | Status: ACTIVE | COMMUNITY
Start: 2023-11-03

## 2024-09-09 RX ORDER — PANTOPRAZOLE SODIUM 40 MG/1
1 TABLET TABLET, DELAYED RELEASE ORAL ONCE A DAY
Qty: 90 | Refills: 1 | Status: ON HOLD | COMMUNITY
Start: 2023-06-16

## 2024-09-09 RX ORDER — AMITRIPTYLINE HYDROCHLORIDE 25 MG/1
1 TABLET AT BEDTIME TABLET, FILM COATED ORAL ONCE A DAY
Qty: 90 | Refills: 1 | Status: ACTIVE | COMMUNITY
Start: 2023-06-16

## 2024-10-09 ENCOUNTER — OFFICE VISIT (OUTPATIENT)
Dept: URBAN - METROPOLITAN AREA CLINIC 115 | Facility: CLINIC | Age: 64
End: 2024-10-09

## 2025-05-19 ENCOUNTER — APPOINTMENT (OUTPATIENT)
Dept: URBAN - METROPOLITAN AREA CLINIC 45 | Facility: CLINIC | Age: 65
Setting detail: DERMATOLOGY
End: 2025-05-19

## 2025-05-19 DIAGNOSIS — L72.8 OTHER FOLLICULAR CYSTS OF THE SKIN AND SUBCUTANEOUS TISSUE: ICD-10-CM

## 2025-05-19 PROCEDURE — 99202 OFFICE O/P NEW SF 15 MIN: CPT

## 2025-05-19 PROCEDURE — ? COUNSELING

## 2025-05-19 PROCEDURE — ? CONSULTATION EXCISION

## 2025-05-19 ASSESSMENT — LOCATION DETAILED DESCRIPTION DERM: LOCATION DETAILED: LEFT INFERIOR LATERAL UPPER BACK

## 2025-05-19 ASSESSMENT — LOCATION SIMPLE DESCRIPTION DERM: LOCATION SIMPLE: LEFT UPPER BACK

## 2025-05-19 ASSESSMENT — LOCATION ZONE DERM: LOCATION ZONE: TRUNK

## 2025-05-19 NOTE — HPI: CYST
How Severe Is Your Cyst?: mild
Is This A New Presentation, Or A Follow-Up?: Cyst
Additional History: New patient \\nLesion on back that drains and keeps coming back\\nPatient states she keeps popping it and it have a foul odor.

## 2025-07-31 ENCOUNTER — OFFICE VISIT (OUTPATIENT)
Dept: URBAN - METROPOLITAN AREA CLINIC 115 | Facility: CLINIC | Age: 65
End: 2025-07-31